# Patient Record
Sex: FEMALE
[De-identification: names, ages, dates, MRNs, and addresses within clinical notes are randomized per-mention and may not be internally consistent; named-entity substitution may affect disease eponyms.]

---

## 2021-09-29 DIAGNOSIS — Z78.9 OTHER SPECIFIED HEALTH STATUS: ICD-10-CM

## 2021-09-29 RX ORDER — BLOOD SUGAR DIAGNOSTIC
STRIP MISCELLANEOUS
Refills: 0 | Status: ACTIVE | COMMUNITY

## 2021-09-29 RX ORDER — LANCETS
EACH MISCELLANEOUS
Refills: 0 | Status: ACTIVE | COMMUNITY

## 2021-09-30 ENCOUNTER — APPOINTMENT (OUTPATIENT)
Dept: PHYSICAL MEDICINE AND REHAB | Facility: CLINIC | Age: 61
End: 2021-09-30
Payer: COMMERCIAL

## 2021-09-30 VITALS
HEIGHT: 66 IN | WEIGHT: 268 LBS | SYSTOLIC BLOOD PRESSURE: 160 MMHG | OXYGEN SATURATION: 98 % | DIASTOLIC BLOOD PRESSURE: 90 MMHG | RESPIRATION RATE: 16 BRPM | TEMPERATURE: 97.5 F | BODY MASS INDEX: 43.07 KG/M2 | HEART RATE: 68 BPM

## 2021-09-30 DIAGNOSIS — M19.072 PRIMARY OSTEOARTHRITIS, RIGHT ANKLE AND FOOT: ICD-10-CM

## 2021-09-30 DIAGNOSIS — M19.071 PRIMARY OSTEOARTHRITIS, RIGHT ANKLE AND FOOT: ICD-10-CM

## 2021-09-30 PROCEDURE — 99203 OFFICE O/P NEW LOW 30 MIN: CPT

## 2021-10-04 PROBLEM — M19.071 ARTHRITIS OF BOTH FEET: Status: ACTIVE | Noted: 2021-09-30

## 2021-10-04 NOTE — HISTORY OF PRESENT ILLNESS
[FreeTextEntry1] : 60 year old female presents with bilateral feet pain that progressive for the last few years. \par Patient also presents for bilateral knee pain.\par \par Bilateral feet pain\par She has been to a podiatrist.  His treatment did not relieve her pain.\par She saw a sports medicine in the [place] who recommended therapy.  After several sessions her pain did not get better and she stopped\par \par Pain:  8/10 Worse: 10/10 Quality: sharp  Frequency: constant\par He has a grinding sensation over the top of the dorsum of the right worse than left foot.  The pain is worse with standing more than 10 minutes.  The pain progresses as the day goes on.  She cannot have shoes rub across the top of the foot.  She denies back pain.\par \par Bilateral knee\par Pain:  8/10 Worse: 10/10 Quality: sharp  Frequency: constant\par The pain starst in the knee woithpout radiation. 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

## 2021-10-19 ENCOUNTER — APPOINTMENT (OUTPATIENT)
Dept: INTERNAL MEDICINE | Facility: CLINIC | Age: 61
End: 2021-10-19
Payer: COMMERCIAL

## 2021-10-19 VITALS
DIASTOLIC BLOOD PRESSURE: 84 MMHG | HEART RATE: 81 BPM | SYSTOLIC BLOOD PRESSURE: 158 MMHG | TEMPERATURE: 96.4 F | WEIGHT: 267 LBS | RESPIRATION RATE: 18 BRPM | BODY MASS INDEX: 42.91 KG/M2 | HEIGHT: 66 IN | OXYGEN SATURATION: 98 %

## 2021-10-19 DIAGNOSIS — Z87.898 PERSONAL HISTORY OF OTHER SPECIFIED CONDITIONS: ICD-10-CM

## 2021-10-19 DIAGNOSIS — Z23 ENCOUNTER FOR IMMUNIZATION: ICD-10-CM

## 2021-10-19 PROCEDURE — 99214 OFFICE O/P EST MOD 30 MIN: CPT | Mod: 25

## 2021-10-19 PROCEDURE — 99401 PREV MED CNSL INDIV APPRX 15: CPT

## 2021-10-19 RX ORDER — PHENTERMINE AND TOPIRAMATE 7.5; 46 MG/1; MG/1
7.5-46 CAPSULE, EXTENDED RELEASE ORAL
Refills: 0 | Status: DISCONTINUED | COMMUNITY
End: 2021-10-19

## 2021-10-20 PROBLEM — Z87.898 HISTORY OF CHEST PAIN: Status: ACTIVE | Noted: 2021-10-20

## 2021-10-21 PROBLEM — Z23 ENCOUNTER FOR IMMUNIZATION: Status: ACTIVE | Noted: 2021-10-21

## 2021-10-21 NOTE — ASSESSMENT
[FreeTextEntry1] : HTN- start losartan, low Na diet\par HLD- cont atorvastatin, d/e/wl\par Prediabetes- cont metformin, d/e/wl\par Morbid Obesity- discussed low calorie diet and exercise as part of a weight loss plan.\par Discussed the importance and benefit of a healthy lifestyle including a heart healthy diet such as the Mediterranean diet and regular exercise to try to lose weight as well as 7 hours of sleep nightly and minimization of stress where possible as a way of decreasing heart risk and improving health x 8 minutes\par Pt. was encouraged to do all relevant screening tests including but not limited to mammogram, gyn visit, colonoscopy, bone density, annual skin exam.\par

## 2021-12-06 ENCOUNTER — APPOINTMENT (OUTPATIENT)
Dept: INTERNAL MEDICINE | Facility: CLINIC | Age: 61
End: 2021-12-06
Payer: COMMERCIAL

## 2021-12-06 ENCOUNTER — NON-APPOINTMENT (OUTPATIENT)
Age: 61
End: 2021-12-06

## 2021-12-06 VITALS
BODY MASS INDEX: 42.91 KG/M2 | SYSTOLIC BLOOD PRESSURE: 124 MMHG | DIASTOLIC BLOOD PRESSURE: 86 MMHG | HEART RATE: 73 BPM | HEIGHT: 66 IN | OXYGEN SATURATION: 97 % | TEMPERATURE: 96.9 F | RESPIRATION RATE: 16 BRPM | WEIGHT: 267 LBS

## 2021-12-06 DIAGNOSIS — Z00.00 ENCOUNTER FOR GENERAL ADULT MEDICAL EXAMINATION W/OUT ABNORMAL FINDINGS: ICD-10-CM

## 2021-12-06 DIAGNOSIS — R94.31 ABNORMAL ELECTROCARDIOGRAM [ECG] [EKG]: ICD-10-CM

## 2021-12-06 DIAGNOSIS — M79.676 PAIN IN UNSPECIFIED TOE(S): ICD-10-CM

## 2021-12-06 DIAGNOSIS — M17.0 BILATERAL PRIMARY OSTEOARTHRITIS OF KNEE: ICD-10-CM

## 2021-12-06 PROCEDURE — 93000 ELECTROCARDIOGRAM COMPLETE: CPT | Mod: 59

## 2021-12-06 PROCEDURE — 99214 OFFICE O/P EST MOD 30 MIN: CPT | Mod: 25

## 2021-12-06 PROCEDURE — 99396 PREV VISIT EST AGE 40-64: CPT | Mod: 25

## 2021-12-06 PROCEDURE — G0447 BEHAVIOR COUNSEL OBESITY 15M: CPT

## 2021-12-06 RX ORDER — METFORMIN HYDROCHLORIDE 500 MG/1
500 TABLET, COATED ORAL
Refills: 0 | Status: DISCONTINUED | COMMUNITY
End: 2021-12-06

## 2021-12-06 RX ORDER — MELOXICAM 15 MG/1
15 TABLET ORAL
Qty: 30 | Refills: 0 | Status: DISCONTINUED | COMMUNITY
Start: 2021-10-25

## 2021-12-07 LAB
25(OH)D3 SERPL-MCNC: 38.8 NG/ML
ALBUMIN SERPL ELPH-MCNC: 4.6 G/DL
ALP BLD-CCNC: 86 U/L
ALT SERPL-CCNC: 16 U/L
ANION GAP SERPL CALC-SCNC: 13 MMOL/L
APPEARANCE: CLEAR
APPEARANCE: CLEAR
AST SERPL-CCNC: 14 U/L
BACTERIA: NEGATIVE
BASOPHILS # BLD AUTO: 0.03 K/UL
BASOPHILS NFR BLD AUTO: 0.8 %
BILIRUB SERPL-MCNC: 0.4 MG/DL
BILIRUBIN URINE: NEGATIVE
BILIRUBIN URINE: NEGATIVE
BLOOD URINE: NEGATIVE
BLOOD URINE: NEGATIVE
BUN SERPL-MCNC: 14 MG/DL
CALCIUM SERPL-MCNC: 10.4 MG/DL
CHLORIDE SERPL-SCNC: 101 MMOL/L
CHOLEST SERPL-MCNC: 305 MG/DL
CO2 SERPL-SCNC: 25 MMOL/L
COLOR: NORMAL
COLOR: NORMAL
CREAT SERPL-MCNC: 0.71 MG/DL
EOSINOPHIL # BLD AUTO: 0.08 K/UL
EOSINOPHIL NFR BLD AUTO: 2.1 %
ESTIMATED AVERAGE GLUCOSE: 203 MG/DL
GLUCOSE QUALITATIVE U: NEGATIVE
GLUCOSE QUALITATIVE U: NEGATIVE
GLUCOSE SERPL-MCNC: 160 MG/DL
HBA1C MFR BLD HPLC: 8.7 %
HCT VFR BLD CALC: 40.7 %
HDLC SERPL-MCNC: 70 MG/DL
HGB BLD-MCNC: 13.2 G/DL
HYALINE CASTS: 1 /LPF
IMM GRANULOCYTES NFR BLD AUTO: 0.3 %
KETONES URINE: NEGATIVE
KETONES URINE: NEGATIVE
LDLC SERPL CALC-MCNC: 195 MG/DL
LEUKOCYTE ESTERASE URINE: ABNORMAL
LEUKOCYTE ESTERASE URINE: ABNORMAL
LYMPHOCYTES # BLD AUTO: 1.82 K/UL
LYMPHOCYTES NFR BLD AUTO: 46.9 %
MAN DIFF?: NORMAL
MCHC RBC-ENTMCNC: 30.3 PG
MCHC RBC-ENTMCNC: 32.4 GM/DL
MCV RBC AUTO: 93.3 FL
MICROSCOPIC-UA: NORMAL
MONOCYTES # BLD AUTO: 0.37 K/UL
MONOCYTES NFR BLD AUTO: 9.5 %
NEUTROPHILS # BLD AUTO: 1.57 K/UL
NEUTROPHILS NFR BLD AUTO: 40.4 %
NITRITE URINE: NEGATIVE
NITRITE URINE: NEGATIVE
NONHDLC SERPL-MCNC: 236 MG/DL
PH URINE: 5.5
PH URINE: 5.5
PLATELET # BLD AUTO: 281 K/UL
POTASSIUM SERPL-SCNC: 4.9 MMOL/L
PROT SERPL-MCNC: 7.2 G/DL
PROTEIN URINE: NEGATIVE
PROTEIN URINE: NEGATIVE
RBC # BLD: 4.36 M/UL
RBC # FLD: 12.7 %
RED BLOOD CELLS URINE: 0 /HPF
SODIUM SERPL-SCNC: 139 MMOL/L
SPECIFIC GRAVITY URINE: 1.01
SPECIFIC GRAVITY URINE: 1.01
SQUAMOUS EPITHELIAL CELLS: 1 /HPF
T3 SERPL-MCNC: 89 NG/DL
T4 FREE SERPL-MCNC: 1.2 NG/DL
TRIGL SERPL-MCNC: 203 MG/DL
TSH SERPL-ACNC: 3.73 UIU/ML
UROBILINOGEN URINE: NORMAL
UROBILINOGEN URINE: NORMAL
VIT B12 SERPL-MCNC: 486 PG/ML
WBC # FLD AUTO: 3.88 K/UL
WHITE BLOOD CELLS URINE: 4 /HPF

## 2021-12-08 PROBLEM — R94.31 ABNORMAL EKG: Status: ACTIVE | Noted: 2021-12-06

## 2021-12-08 PROBLEM — M17.0 PRIMARY OSTEOARTHRITIS OF BOTH KNEES: Status: ACTIVE | Noted: 2021-10-04

## 2021-12-08 PROBLEM — Z00.00 ENCOUNTER FOR PREVENTIVE HEALTH EXAMINATION: Status: ACTIVE | Noted: 2021-09-29

## 2021-12-08 PROBLEM — M79.676 TOE PAIN: Status: RESOLVED | Noted: 2021-10-20 | Resolved: 2021-12-08

## 2021-12-08 NOTE — ASSESSMENT
[FreeTextEntry1] : DM2- chk labs, hasn’t been on meds\par HTN- stable, cont losartan\par HLD- chk labs, cont atorv 10mg\par Morbid Obesity- discussed low calorie diet and exercise as part of a weight loss plan.\par OA Knees- sees ortho\par see optho yearly\par chk feet nightly\par take all meds as prescribed\par aware of complications of diabetes including but not limited to retinopathy,neuropathy, cvd, esrd,cva,amputations. \par aware of the importance of diet and exercise and maintaining an adequate weight\par chk labs\par Low Na diet\par diet and exercise\par low cholesterol diet\par exercise\par wt loss.\par Discussed healthy lifestyle, updated vaccinations, healthy diet, exercise, chk labs, discussed appropriate screening tests including colonoscopy, mammo, bone density and pap.\par \par

## 2021-12-08 NOTE — HEALTH RISK ASSESSMENT
[Yes] : Yes [2 - 3 times a week (3 pts)] : 2 - 3  times a week (3 points) [1 or 2 (0 pts)] : 1 or 2 (0 points) [Patient reported mammogram was normal] : Patient reported mammogram was normal [Good] : ~his/her~  mood as  good [] : No [No] : In the past 12 months have you used drugs other than those required for medical reasons? No [No falls in past year] : Patient reported no falls in the past year [0] : 2) Feeling down, depressed, or hopeless: Not at all (0) [PHQ-2 Negative - No further assessment needed] : PHQ-2 Negative - No further assessment needed [Audit-CScore] : 3 [de-identified] : none [de-identified] : reg [[id number]] : 0 [Patient declined colonoscopy] : Patient declined colonoscopy [Change in mental status noted] : No change in mental status noted [Language] : denies difficulty with language [Behavior] : denies difficulty with behavior [Handling Complex Tasks] : denies difficulty handling complex tasks [Reasoning] : denies difficulty with reasoning [None] : None [Fully functional (bathing, dressing, toileting, transferring, walking, feeding)] : Fully functional (bathing, dressing, toileting, transferring, walking, feeding) [Fully functional (using the telephone, shopping, preparing meals, housekeeping, doing laundry, using] : Fully functional and needs no help or supervision to perform IADLs (using the telephone, shopping, preparing meals, housekeeping, doing laundry, using transportation, managing medications and managing finances) [Reports changes in hearing] : Reports no changes in hearing [Reports changes in vision] : Reports no changes in vision [Reports changes in dental health] : Reports no changes in dental health [MammogramDate] : 2016

## 2021-12-08 NOTE — HISTORY OF PRESENT ILLNESS
[FreeTextEntry1] : joseluis [de-identified] : covid booster this week\par flu 2wk\par decl shingrex\par td pending\par sees optho twice a year.\par bilat knee pain- saw ortho- xrays showed arhtritis. \par Otherwise feels good. Appet, sleep, bms, voiding, mood all ok. \par diff. losing weight\par

## 2021-12-08 NOTE — PHYSICAL EXAM
[No Acute Distress] : no acute distress [Well Nourished] : well nourished [Well Developed] : well developed [Well-Appearing] : well-appearing [Normal Sclera/Conjunctiva] : normal sclera/conjunctiva [PERRL] : pupils equal round and reactive to light [EOMI] : extraocular movements intact [Normal Outer Ear/Nose] : the outer ears and nose were normal in appearance [Normal Oropharynx] : the oropharynx was normal [No JVD] : no jugular venous distention [No Lymphadenopathy] : no lymphadenopathy [Supple] : supple [Thyroid Normal, No Nodules] : the thyroid was normal and there were no nodules present [No Respiratory Distress] : no respiratory distress  [No Accessory Muscle Use] : no accessory muscle use [Clear to Auscultation] : lungs were clear to auscultation bilaterally [Normal Rate] : normal rate  [Regular Rhythm] : with a regular rhythm [Normal S1, S2] : normal S1 and S2 [No Murmur] : no murmur heard [No Carotid Bruits] : no carotid bruits [No Abdominal Bruit] : a ~M bruit was not heard ~T in the abdomen [No Varicosities] : no varicosities [Pedal Pulses Present] : the pedal pulses are present [No Edema] : there was no peripheral edema [No Palpable Aorta] : no palpable aorta [No Extremity Clubbing/Cyanosis] : no extremity clubbing/cyanosis [Soft] : abdomen soft [Non Tender] : non-tender [Non-distended] : non-distended [No Masses] : no abdominal mass palpated [No HSM] : no HSM [Normal Bowel Sounds] : normal bowel sounds [Normal Posterior Cervical Nodes] : no posterior cervical lymphadenopathy [Normal Anterior Cervical Nodes] : no anterior cervical lymphadenopathy [No CVA Tenderness] : no CVA  tenderness [No Spinal Tenderness] : no spinal tenderness [No Joint Swelling] : no joint swelling [Grossly Normal Strength/Tone] : grossly normal strength/tone [No Rash] : no rash [Coordination Grossly Intact] : coordination grossly intact [No Focal Deficits] : no focal deficits [Normal Gait] : normal gait [Deep Tendon Reflexes (DTR)] : deep tendon reflexes were 2+ and symmetric [Normal Affect] : the affect was normal [Normal Insight/Judgement] : insight and judgment were intact [de-identified] : knees- arthritic

## 2021-12-08 NOTE — COUNSELING
[Potential consequences of obesity discussed] : Potential consequences of obesity discussed [Benefits of weight loss discussed] : Benefits of weight loss discussed [Structured Weight Management Program suggested:] : Structured weight management program suggested [Encouraged to maintain food diary] : Encouraged to maintain food diary [Encouraged to increase physical activity] : Encouraged to increase physical activity [Target Wt Loss Goal ___] : Weight Loss Goals: Target weight loss goal [unfilled] lbs [Decrease Portions] : decrease portions [____ min/wk Activity] : [unfilled] min/wk activity [FreeTextEntry1] : 2221cal [FreeTextEntry4] : 15

## 2021-12-09 LAB — BACTERIA UR CULT: NORMAL

## 2022-03-04 ENCOUNTER — APPOINTMENT (OUTPATIENT)
Dept: INTERNAL MEDICINE | Facility: CLINIC | Age: 62
End: 2022-03-04
Payer: COMMERCIAL

## 2022-03-04 DIAGNOSIS — Z11.52 ENCOUNTER FOR SCREENING FOR COVID-19: ICD-10-CM

## 2022-03-04 PROCEDURE — 99211 OFF/OP EST MAY X REQ PHY/QHP: CPT

## 2022-03-07 ENCOUNTER — NON-APPOINTMENT (OUTPATIENT)
Age: 62
End: 2022-03-07

## 2022-03-07 LAB — SARS-COV-2 N GENE NPH QL NAA+PROBE: NOT DETECTED

## 2022-05-02 ENCOUNTER — APPOINTMENT (OUTPATIENT)
Dept: INTERNAL MEDICINE | Facility: CLINIC | Age: 62
End: 2022-05-02
Payer: COMMERCIAL

## 2022-05-02 VITALS
SYSTOLIC BLOOD PRESSURE: 140 MMHG | TEMPERATURE: 98.3 F | HEART RATE: 66 BPM | DIASTOLIC BLOOD PRESSURE: 86 MMHG | WEIGHT: 239 LBS | HEIGHT: 66 IN | OXYGEN SATURATION: 98 % | RESPIRATION RATE: 16 BRPM | BODY MASS INDEX: 38.41 KG/M2

## 2022-05-02 DIAGNOSIS — E11.9 TYPE 2 DIABETES MELLITUS W/OUT COMPLICATIONS: ICD-10-CM

## 2022-05-02 DIAGNOSIS — E78.5 HYPERLIPIDEMIA, UNSPECIFIED: ICD-10-CM

## 2022-05-02 DIAGNOSIS — R73.03 PREDIABETES.: ICD-10-CM

## 2022-05-02 DIAGNOSIS — E11.65 TYPE 2 DIABETES MELLITUS WITH HYPERGLYCEMIA: ICD-10-CM

## 2022-05-02 DIAGNOSIS — E66.01 MORBID (SEVERE) OBESITY DUE TO EXCESS CALORIES: ICD-10-CM

## 2022-05-02 PROCEDURE — 99214 OFFICE O/P EST MOD 30 MIN: CPT | Mod: 25

## 2022-05-02 NOTE — ASSESSMENT
[FreeTextEntry1] : DM2- chk labs, cont jardiamce, metformin\par HTN- stable, cont losartan\par HLD- chk labs, cont atorv 10mg\par Morbid Obesity- discussed low calorie diet and exercise as part of a weight loss plan. x 8 min\par OA Knees- sees ortho\par see optho yearly\par chk feet nightly\par take all meds as prescribed\par aware of complications of diabetes including but not limited to retinopathy,neuropathy, cvd, esrd,cva,amputations. \par aware of the importance of diet and exercise and maintaining an adequate weight\par chk labs\par Low Na diet\par diet and exercise\par low cholesterol diet\par exercise\par wt loss.\par Discussed healthy lifestyle, updated vaccinations, healthy diet, exercise, chk labs, discussed appropriate screening tests including colonoscopy, mammo, bone density and pap.\par \par

## 2022-05-02 NOTE — HISTORY OF PRESENT ILLNESS
[FreeTextEntry1] : The patient is here for a follow up visit to review their medications and chronic medical conditions.\par dm2 [de-identified] : Here for follow up visit. Doing well. Appetitie, sleep,bms,voiding, mood all ok. \par Chart reviewed.\par

## 2022-05-04 LAB
ALBUMIN SERPL ELPH-MCNC: 4.4 G/DL
ALP BLD-CCNC: 61 U/L
ALT SERPL-CCNC: 16 U/L
ANION GAP SERPL CALC-SCNC: 14 MMOL/L
APPEARANCE: CLEAR
AST SERPL-CCNC: 19 U/L
BACTERIA: NEGATIVE
BASOPHILS # BLD AUTO: 0.03 K/UL
BASOPHILS NFR BLD AUTO: 0.8 %
BILIRUB SERPL-MCNC: 0.3 MG/DL
BILIRUBIN URINE: NEGATIVE
BLOOD URINE: NEGATIVE
BUN SERPL-MCNC: 13 MG/DL
CALCIUM SERPL-MCNC: 9.8 MG/DL
CHLORIDE SERPL-SCNC: 109 MMOL/L
CHOLEST SERPL-MCNC: 240 MG/DL
CO2 SERPL-SCNC: 22 MMOL/L
COLOR: YELLOW
CREAT SERPL-MCNC: 0.71 MG/DL
CREAT SPEC-SCNC: 157 MG/DL
EGFR: 96 ML/MIN/1.73M2
EOSINOPHIL # BLD AUTO: 0.12 K/UL
EOSINOPHIL NFR BLD AUTO: 3.1 %
ESTIMATED AVERAGE GLUCOSE: 160 MG/DL
GLUCOSE QUALITATIVE U: NEGATIVE
GLUCOSE SERPL-MCNC: 135 MG/DL
HBA1C MFR BLD HPLC: 7.2 %
HCT VFR BLD CALC: 38.5 %
HDLC SERPL-MCNC: 60 MG/DL
HGB BLD-MCNC: 11.7 G/DL
HYALINE CASTS: 0 /LPF
IMM GRANULOCYTES NFR BLD AUTO: 0.3 %
KETONES URINE: NEGATIVE
LDLC SERPL CALC-MCNC: 154 MG/DL
LEUKOCYTE ESTERASE URINE: NEGATIVE
LYMPHOCYTES # BLD AUTO: 1.48 K/UL
LYMPHOCYTES NFR BLD AUTO: 37.8 %
MAN DIFF?: NORMAL
MCHC RBC-ENTMCNC: 28.4 PG
MCHC RBC-ENTMCNC: 30.4 GM/DL
MCV RBC AUTO: 93.4 FL
MICROALBUMIN 24H UR DL<=1MG/L-MCNC: 2.2 MG/DL
MICROALBUMIN/CREAT 24H UR-RTO: 14 MG/G
MICROSCOPIC-UA: NORMAL
MONOCYTES # BLD AUTO: 0.36 K/UL
MONOCYTES NFR BLD AUTO: 9.2 %
NEUTROPHILS # BLD AUTO: 1.92 K/UL
NEUTROPHILS NFR BLD AUTO: 48.8 %
NITRITE URINE: NEGATIVE
NONHDLC SERPL-MCNC: 180 MG/DL
PH URINE: 5.5
PLATELET # BLD AUTO: 241 K/UL
POTASSIUM SERPL-SCNC: 4.2 MMOL/L
PROT SERPL-MCNC: 6.9 G/DL
PROTEIN URINE: NORMAL
RBC # BLD: 4.12 M/UL
RBC # FLD: 14.1 %
RED BLOOD CELLS URINE: 1 /HPF
SODIUM SERPL-SCNC: 146 MMOL/L
SPECIFIC GRAVITY URINE: 1.02
SQUAMOUS EPITHELIAL CELLS: 2 /HPF
T3 SERPL-MCNC: 99 NG/DL
T4 FREE SERPL-MCNC: 1.3 NG/DL
TRIGL SERPL-MCNC: 128 MG/DL
TSH SERPL-ACNC: 1.9 UIU/ML
UROBILINOGEN URINE: NORMAL
WBC # FLD AUTO: 3.92 K/UL
WHITE BLOOD CELLS URINE: 5 /HPF

## 2022-05-05 LAB — BACTERIA UR CULT: NORMAL

## 2022-05-06 ENCOUNTER — RX CHANGE (OUTPATIENT)
Age: 62
End: 2022-05-06

## 2022-05-06 RX ORDER — ATORVASTATIN CALCIUM 20 MG/1
20 TABLET, FILM COATED ORAL
Qty: 90 | Refills: 3 | Status: ACTIVE | COMMUNITY
Start: 1900-01-01 | End: 1900-01-01

## 2022-05-09 ENCOUNTER — RX CHANGE (OUTPATIENT)
Age: 62
End: 2022-05-09

## 2022-06-02 ENCOUNTER — NON-APPOINTMENT (OUTPATIENT)
Age: 62
End: 2022-06-02

## 2022-06-10 ENCOUNTER — APPOINTMENT (OUTPATIENT)
Dept: GASTROENTEROLOGY | Facility: CLINIC | Age: 62
End: 2022-06-10

## 2022-06-30 ENCOUNTER — APPOINTMENT (OUTPATIENT)
Dept: INTERNAL MEDICINE | Facility: CLINIC | Age: 62
End: 2022-06-30

## 2022-11-02 ENCOUNTER — RX RENEWAL (OUTPATIENT)
Age: 62
End: 2022-11-02

## 2022-11-13 ENCOUNTER — RX RENEWAL (OUTPATIENT)
Age: 62
End: 2022-11-13

## 2023-01-19 ENCOUNTER — APPOINTMENT (OUTPATIENT)
Dept: INTERNAL MEDICINE | Facility: CLINIC | Age: 63
End: 2023-01-19
Payer: COMMERCIAL

## 2023-01-19 VITALS
HEIGHT: 66 IN | BODY MASS INDEX: 39.86 KG/M2 | DIASTOLIC BLOOD PRESSURE: 90 MMHG | RESPIRATION RATE: 17 BRPM | OXYGEN SATURATION: 98 % | TEMPERATURE: 98 F | HEART RATE: 72 BPM | WEIGHT: 248 LBS | SYSTOLIC BLOOD PRESSURE: 140 MMHG

## 2023-01-19 DIAGNOSIS — S80.12XA CONTUSION OF LEFT LOWER LEG, INITIAL ENCOUNTER: ICD-10-CM

## 2023-01-19 DIAGNOSIS — I10 ESSENTIAL (PRIMARY) HYPERTENSION: ICD-10-CM

## 2023-01-19 PROCEDURE — 99214 OFFICE O/P EST MOD 30 MIN: CPT
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
## 2023-01-19 NOTE — PHYSICAL EXAM
[No Acute Distress] : no acute distress [Well Nourished] : well nourished [Well Developed] : well developed [Well-Appearing] : well-appearing [Normal Sclera/Conjunctiva] : normal sclera/conjunctiva [PERRL] : pupils equal round and reactive to light [EOMI] : extraocular movements intact [Normal Outer Ear/Nose] : the outer ears and nose were normal in appearance [Normal Oropharynx] : the oropharynx was normal [No JVD] : no jugular venous distention [No Lymphadenopathy] : no lymphadenopathy [Supple] : supple [Thyroid Normal, No Nodules] : the thyroid was normal and there were no nodules present [No Respiratory Distress] : no respiratory distress  [No Accessory Muscle Use] : no accessory muscle use [Clear to Auscultation] : lungs were clear to auscultation bilaterally [Normal Rate] : normal rate  [Regular Rhythm] : with a regular rhythm [Normal S1, S2] : normal S1 and S2 [No Murmur] : no murmur heard [No Carotid Bruits] : no carotid bruits [No Abdominal Bruit] : a ~M bruit was not heard ~T in the abdomen [No Varicosities] : no varicosities [Pedal Pulses Present] : the pedal pulses are present [No Edema] : there was no peripheral edema [No Palpable Aorta] : no palpable aorta [No Extremity Clubbing/Cyanosis] : no extremity clubbing/cyanosis [Soft] : abdomen soft [Non Tender] : non-tender [Non-distended] : non-distended [No Masses] : no abdominal mass palpated [No HSM] : no HSM [Normal Bowel Sounds] : normal bowel sounds [Normal Posterior Cervical Nodes] : no posterior cervical lymphadenopathy [Normal Anterior Cervical Nodes] : no anterior cervical lymphadenopathy [No CVA Tenderness] : no CVA  tenderness [No Spinal Tenderness] : no spinal tenderness [No Joint Swelling] : no joint swelling [Grossly Normal Strength/Tone] : grossly normal strength/tone [No Rash] : no rash [Coordination Grossly Intact] : coordination grossly intact [No Focal Deficits] : no focal deficits [Normal Gait] : normal gait [Deep Tendon Reflexes (DTR)] : deep tendon reflexes were 2+ and symmetric [Normal Affect] : the affect was normal [Normal Insight/Judgement] : insight and judgment were intact [de-identified] : left anteromedial shin- 5 cm area of induration, hyperpigmentation, no calf tend or edema noted. homans neg.

## 2023-01-19 NOTE — ASSESSMENT
[FreeTextEntry1] : Left leg contusion- conserv tx\par HTN- ilncr losartan to 100mg , low Na diet\par rto for awv.

## 2023-02-08 ENCOUNTER — RX RENEWAL (OUTPATIENT)
Age: 63
End: 2023-02-08

## 2023-02-08 DIAGNOSIS — E11.9 TYPE 2 DIABETES MELLITUS W/OUT COMPLICATIONS: ICD-10-CM

## 2023-02-10 RX ORDER — LOSARTAN POTASSIUM 100 MG/1
100 TABLET, FILM COATED ORAL
Qty: 90 | Refills: 3 | Status: ACTIVE | COMMUNITY
Start: 2021-10-19 | End: 1900-01-01

## 2023-03-02 ENCOUNTER — APPOINTMENT (OUTPATIENT)
Dept: OTOLARYNGOLOGY | Facility: CLINIC | Age: 63
End: 2023-03-02
Payer: COMMERCIAL

## 2023-03-02 PROCEDURE — 69220 CLEAN OUT MASTOID CAVITY: CPT | Mod: LT

## 2023-03-02 PROCEDURE — 99204 OFFICE O/P NEW MOD 45 MIN: CPT | Mod: 25

## 2023-03-02 PROCEDURE — 92567 TYMPANOMETRY: CPT

## 2023-03-02 PROCEDURE — 92557 COMPREHENSIVE HEARING TEST: CPT

## 2023-03-02 RX ORDER — CIPROFLOXACIN AND DEXAMETHASONE 3; 1 MG/ML; MG/ML
0.3-0.1 SUSPENSION/ DROPS AURICULAR (OTIC) TWICE DAILY
Qty: 8 | Refills: 1 | Status: ACTIVE | COMMUNITY
Start: 2023-03-02 | End: 1900-01-01

## 2023-03-02 NOTE — PHYSICAL EXAM
[Normal] : no abnormal secretions [de-identified] : mastoid crusting left ear, extensive powder, right ear with cerumen impaction  [de-identified] : left TM recon intact, right ?50% perforation vs retraction posterior inferior

## 2023-03-02 NOTE — CONSULT LETTER
[Dear  ___] : Dear  [unfilled], [Consult Letter:] : I had the pleasure of evaluating your patient, [unfilled]. [Sincerely,] : Sincerely, [FreeTextEntry3] : Maureen Thakur MD\par Otolaryngology- Facial Plastics \par [address]\[place] [address]\par (P) - [phone]\par (F) - [phone]\par

## 2023-03-02 NOTE — PROCEDURE
[Risk and Benefits Discussed] : The purpose, risks, discomforts, benefits and alternatives of the procedure have been explained to the patient including no treatment. [Same] : same as the Pre Op Dx. [] : Debridement of Mastoid [FreeTextEntry3] : After informed verbal consent is obtained, cerumen is removed from the right ear canal with a  suction. Pt tolerated well, no residual ear canal irritation. \par  \par \par

## 2023-03-02 NOTE — REASON FOR VISIT
[Initial Consultation] : an initial consultation for [Ear Drainage] : ear drainage [Ear Pain] : ear pain [Hearing Loss] : hearing loss

## 2023-03-02 NOTE — ASSESSMENT
[FreeTextEntry1] : Ms. COLE is a 62 year female with extensive ear history s/p L mastoidectomy many years ago, with worsening L tinnitus and ear clogging over the last 4 months. On exam she has extensive debris L and cerumen R\par \par - mastoid debridement left \par - cerumen removal right \par - audio today shows significant hearing loss, pt notes that the left ear only recently worsened to such severe hearing loss \par - will give Ciprodex for use in the event of otorrhea, advised do not take more than 7 days in a row \par - will refer to Otology - message sent to help facilitate appt, will refer sooner than next debridement given the degree of hearing loss and that it has changed recently \par \par
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
## 2023-03-02 NOTE — DATA REVIEWED
[de-identified] : 03/02/23: AS: moderate severe to profound mixed HL \par AD: normal to profound SNHL

## 2023-03-02 NOTE — END OF VISIT
[FreeTextEntry3] : I personally saw and examined RAMEZ COLE in detail.  I spoke to JULIO Haines regarding the assessment and plan of care. I performed the procedures and relevant physical exam.  I have reviewed the above assessment and plan of care and I agree.  I have made changes to the body of the note wherever necessary and appropriate.\par

## 2023-03-20 ENCOUNTER — APPOINTMENT (OUTPATIENT)
Dept: INTERNAL MEDICINE | Facility: CLINIC | Age: 63
End: 2023-03-20

## 2023-03-27 ENCOUNTER — APPOINTMENT (OUTPATIENT)
Dept: INTERNAL MEDICINE | Facility: CLINIC | Age: 63
End: 2023-03-27
Payer: COMMERCIAL

## 2023-03-27 VITALS
DIASTOLIC BLOOD PRESSURE: 96 MMHG | BODY MASS INDEX: 39.86 KG/M2 | HEART RATE: 81 BPM | TEMPERATURE: 97.9 F | WEIGHT: 248 LBS | OXYGEN SATURATION: 99 % | RESPIRATION RATE: 17 BRPM | SYSTOLIC BLOOD PRESSURE: 148 MMHG | HEIGHT: 66 IN

## 2023-03-27 DIAGNOSIS — M71.21 SYNOVIAL CYST OF POPLITEAL SPACE [BAKER], RIGHT KNEE: ICD-10-CM

## 2023-03-27 DIAGNOSIS — M71.22 SYNOVIAL CYST OF POPLITEAL SPACE [BAKER], LEFT KNEE: ICD-10-CM

## 2023-03-27 PROCEDURE — 99212 OFFICE O/P EST SF 10 MIN: CPT

## 2023-03-28 ENCOUNTER — RX CHANGE (OUTPATIENT)
Age: 63
End: 2023-03-28

## 2023-03-28 PROBLEM — M71.22 SYNOVIAL CYST OF LEFT KNEE: Status: RESOLVED | Noted: 2023-03-27 | Resolved: 2023-03-28

## 2023-03-28 RX ORDER — DICLOFENAC SODIUM 1% 10 MG/G
1 GEL TOPICAL
Qty: 100 | Refills: 1 | Status: DISCONTINUED | COMMUNITY
Start: 2023-03-27 | End: 2023-03-28

## 2023-03-28 RX ORDER — DICLOFENAC SODIUM 1% 10 MG/G
1 GEL TOPICAL
Qty: 100 | Refills: 1 | Status: ACTIVE | COMMUNITY
Start: 2023-03-28 | End: 1900-01-01

## 2023-03-28 NOTE — REVIEW OF SYSTEMS
[Negative] : Heme/Lymph [FreeTextEntry2] : Morbidly obese [FreeTextEntry9] : Pain and fullness posterior left knee

## 2023-03-28 NOTE — HISTORY OF PRESENT ILLNESS
[FreeTextEntry1] : cc/ left knee pain. swollen.  [de-identified] : Patient had sudden pain pot of the posterior knee.  She has a history of a Baker's cyst.  Patient also has a history of hypertension hyperlipidemia and morbid obesity

## 2023-03-28 NOTE — PHYSICAL EXAM
[No Acute Distress] : no acute distress [Well Nourished] : well nourished [Well Developed] : well developed [Well-Appearing] : well-appearing [No Murmur] : no murmur heard [No Carotid Bruits] : no carotid bruits [No Abdominal Bruit] : a ~M bruit was not heard ~T in the abdomen [No Varicosities] : no varicosities [Pedal Pulses Present] : the pedal pulses are present [No Edema] : there was no peripheral edema [No Palpable Aorta] : no palpable aorta [No Extremity Clubbing/Cyanosis] : no extremity clubbing/cyanosis [Soft] : abdomen soft [Non Tender] : non-tender [Non-distended] : non-distended [No Masses] : no abdominal mass palpated [No HSM] : no HSM [Normal Bowel Sounds] : normal bowel sounds [No CVA Tenderness] : no CVA  tenderness [No Spinal Tenderness] : no spinal tenderness [No Joint Swelling] : no joint swelling [Grossly Normal Strength/Tone] : grossly normal strength/tone [No Rash] : no rash [Coordination Grossly Intact] : coordination grossly intact [No Focal Deficits] : no focal deficits [Normal Gait] : normal gait [Deep Tendon Reflexes (DTR)] : deep tendon reflexes were 2+ and symmetric [Normal Affect] : the affect was normal [Normal Insight/Judgement] : insight and judgment were intact [Normal] : no rash [Alert and Oriented x3] : oriented to person, place, and time [de-identified] : Morbidly obese [de-identified] : Left lower extremity examined [de-identified] :  Left popliteal fossa full and tender popliteal cyst noted. [de-identified] : We will neurological exam left lower extremity unremarkable

## 2023-03-28 NOTE — HEALTH RISK ASSESSMENT
[Fair] :  ~his/her~ mood as fair [None] : None [Fully functional (bathing, dressing, toileting, transferring, walking, feeding)] : Fully functional (bathing, dressing, toileting, transferring, walking, feeding) [Fully functional (using the telephone, shopping, preparing meals, housekeeping, doing laundry, using] : Fully functional and needs no help or supervision to perform IADLs (using the telephone, shopping, preparing meals, housekeeping, doing laundry, using transportation, managing medications and managing finances) [Reports normal functional visual acuity (ie: able to read med bottle)] : Reports normal functional visual acuity [de-identified] : Minimal [Change in mental status noted] : No change in mental status noted [Language] : denies difficulty with language [Behavior] : denies difficulty with behavior [Learning/Retaining New Information] : denies difficulty learning/retaining new information [Handling Complex Tasks] : denies difficulty handling complex tasks [Reasoning] : denies difficulty with reasoning [Spatial Ability and Orientation] : denies difficulty with spatial ability and orientation [Reports changes in hearing] : Reports no changes in hearing

## 2023-03-29 ENCOUNTER — APPOINTMENT (OUTPATIENT)
Dept: ORTHOPEDIC SURGERY | Facility: CLINIC | Age: 63
End: 2023-03-29
Payer: COMMERCIAL

## 2023-03-29 VITALS — HEIGHT: 66 IN | WEIGHT: 249 LBS | BODY MASS INDEX: 40.02 KG/M2

## 2023-03-29 PROCEDURE — 99204 OFFICE O/P NEW MOD 45 MIN: CPT | Mod: 25

## 2023-03-29 PROCEDURE — 20610 DRAIN/INJ JOINT/BURSA W/O US: CPT | Mod: 50

## 2023-03-29 PROCEDURE — J3490M: CUSTOM

## 2023-03-29 PROCEDURE — 99214 OFFICE O/P EST MOD 30 MIN: CPT | Mod: 25

## 2023-03-29 PROCEDURE — 73564 X-RAY EXAM KNEE 4 OR MORE: CPT | Mod: 50

## 2023-03-29 NOTE — HISTORY OF PRESENT ILLNESS
[de-identified] : 62 year old female  (retired )   B/L knee pain L>R knee pain since 2013 with no specific injury worsening since march 2023\par The pain is located  posterior\par The pain is associated with  swelling, stiffness\par Worse with walking, standing, stairs  and better at rest.\par Has tried Advil, topicals\par \par

## 2023-03-29 NOTE — IMAGING

## 2023-03-29 NOTE — ASSESSMENT
[FreeTextEntry1] : Bilateral  X-Ray Examination of the KNEE (4 views): severe medial and patellofemoral degenerate changes.\par \par - We discussed their diagnosis and treatment options at length including the risks and benefits of both surgical treatment with a knee replacement and non-surgical options.\par - We will continue conservative treatment with activity modification, PT, icing, weight loss, and anti-inflammatory medications.\par - The patient was provided with a PT prescription to work on ROM, hip ER/abductors strengthening, quad/hamstring stretches and strengthening, and other exercises \par - The patient was advised to let pain guide the gradual advancement of activities. \par - We also discussed the possible of a corticosteroid injection in order to help decrease inflammation and pain so that they can perform better therapy.\par - The risks, benefits, and alternatives to corticosteroid injection were reviewed with the patient and they wished to proceed with this treatment course. \par - Left knee CSI \par - Follow up as needed in 6 weeks to re-evaluate, if no improvement we spoke about possibility of viscosupplementation injections\par \par \par (may eventually need TKA)

## 2023-03-30 ENCOUNTER — APPOINTMENT (OUTPATIENT)
Dept: OTOLARYNGOLOGY | Facility: CLINIC | Age: 63
End: 2023-03-30
Payer: COMMERCIAL

## 2023-03-30 VITALS
SYSTOLIC BLOOD PRESSURE: 173 MMHG | WEIGHT: 245 LBS | BODY MASS INDEX: 40.82 KG/M2 | DIASTOLIC BLOOD PRESSURE: 109 MMHG | HEIGHT: 65 IN | HEART RATE: 57 BPM

## 2023-03-30 DIAGNOSIS — Z90.89 ACQUIRED ABSENCE OF OTHER ORGANS: ICD-10-CM

## 2023-03-30 DIAGNOSIS — H90.2 CONDUCTIVE HEARING LOSS, UNSPECIFIED: ICD-10-CM

## 2023-03-30 DIAGNOSIS — H70.12 CHRONIC MASTOIDITIS, LEFT EAR: ICD-10-CM

## 2023-03-30 DIAGNOSIS — H61.23 IMPACTED CERUMEN, BILATERAL: ICD-10-CM

## 2023-03-30 DIAGNOSIS — Z78.9 OTHER SPECIFIED HEALTH STATUS: ICD-10-CM

## 2023-03-30 DIAGNOSIS — Z86.79 PERSONAL HISTORY OF OTHER DISEASES OF THE CIRCULATORY SYSTEM: ICD-10-CM

## 2023-03-30 DIAGNOSIS — H93.13 TINNITUS, BILATERAL: ICD-10-CM

## 2023-03-30 DIAGNOSIS — Z86.39 PERSONAL HISTORY OF OTHER ENDOCRINE, NUTRITIONAL AND METABOLIC DISEASE: ICD-10-CM

## 2023-03-30 PROCEDURE — 69220 CLEAN OUT MASTOID CAVITY: CPT | Mod: LT

## 2023-03-30 PROCEDURE — 99213 OFFICE O/P EST LOW 20 MIN: CPT | Mod: 25

## 2023-03-30 RX ORDER — ADHESIVE TAPE 3"X 2.3 YD
50 MCG TAPE, NON-MEDICATED TOPICAL
Refills: 0 | Status: DISCONTINUED | COMMUNITY
End: 2023-03-30

## 2023-03-30 RX ORDER — METFORMIN ER 500 MG 500 MG/1
500 TABLET ORAL DAILY
Qty: 30 | Refills: 5 | Status: DISCONTINUED | COMMUNITY
Start: 2021-12-07 | End: 2023-03-30

## 2023-03-30 NOTE — ASSESSMENT
Patient [FreeTextEntry1] : Otoscope exam today shows dry left canal wall down cavity, with thickened near tympanic membrane reconstruction, unable to appreciate whether effusion is present based on thickening.  Posterior mastoid bowl with minor debris which was removed.  Right tympanic membrane with small anterior perforation but no retraction.  I personally reviewed and interpreted prior audiogram results which shows a left mixed hearing loss and right mostly sensorineural loss, with impaired speech discrimination scores bilaterally.\par \par Unclear etiology for subjective perception of accelerated hearing decline in left ear over the past few months.  We will check temporal bone CT to assess for middle ear fluid behind reconstructed tympanic membrane as a potential etiology of worsened hearing loss, although I also counseled her that based on the significantly thickened tympanic membrane it would be difficult to place a tube even if fluid were identified.  Pending CT we will also provide medical clearance for hearing aids bilaterally.  Would offer course of oral steroids but patient is diabetic.

## 2023-03-30 NOTE — PROCEDURE
[FreeTextEntry3] : Procedure note:  Left mastoid debridement.\par \par Description of Procedure:  Mastoid debridement was performed under the operating microscope using otologic instrumentation.  The patient tolerated the procedure without complications.\par

## 2023-03-30 NOTE — HISTORY OF PRESENT ILLNESS
[de-identified] : 62 year old woman, referred by Dr. Thakur, for Left mastoid debridement\par History of CHL - bilateral cerumen impaction - bilateral tinnitus - s/p Left mastoidectomy several years ago\par **2 other ear surgeries in the past (unsure of name) - additional surgery of Left ear and other on Right ear\par Prescribed Ciprodex drops - has not used as of yet - no otorrhea\par States hearing on Left diminished over the past few months - No hearing aids used\par Reports intermittent tinnitus of both ears - occasional dizziness when cold air pushes into Left ear\par Denies otalgia, otorrhea, headaches related to ears, vertigo, recent fevers or ear infections\par \par **BP elevated - states forgot to take medication this morning - Patient advised to follow up with PCP regarding high blood pressure.

## 2023-04-12 ENCOUNTER — OUTPATIENT (OUTPATIENT)
Dept: OUTPATIENT SERVICES | Facility: HOSPITAL | Age: 63
LOS: 1 days | End: 2023-04-12
Payer: COMMERCIAL

## 2023-04-12 ENCOUNTER — APPOINTMENT (OUTPATIENT)
Dept: CT IMAGING | Facility: IMAGING CENTER | Age: 63
End: 2023-04-12
Payer: COMMERCIAL

## 2023-04-12 DIAGNOSIS — H90.2 CONDUCTIVE HEARING LOSS, UNSPECIFIED: ICD-10-CM

## 2023-04-12 PROCEDURE — 70480 CT ORBIT/EAR/FOSSA W/O DYE: CPT | Mod: 26

## 2023-04-12 PROCEDURE — 70480 CT ORBIT/EAR/FOSSA W/O DYE: CPT

## 2023-04-13 ENCOUNTER — APPOINTMENT (OUTPATIENT)
Dept: ORTHOPEDIC SURGERY | Facility: CLINIC | Age: 63
End: 2023-04-13
Payer: COMMERCIAL

## 2023-04-13 VITALS — HEIGHT: 65 IN | WEIGHT: 245 LBS | BODY MASS INDEX: 40.82 KG/M2

## 2023-04-13 PROCEDURE — 20610 DRAIN/INJ JOINT/BURSA W/O US: CPT | Mod: 50

## 2023-04-13 PROCEDURE — 99214 OFFICE O/P EST MOD 30 MIN: CPT | Mod: 25

## 2023-04-13 PROCEDURE — J3490M: CUSTOM

## 2023-04-13 NOTE — IMAGING

## 2023-04-13 NOTE — HISTORY OF PRESENT ILLNESS
[de-identified] : 62 year old female  (retired )   B/L knee pain L>R knee pain since 2013 with no specific injury worsening since march 2023\par The pain is located  posterior\par The pain is associated with  swelling, stiffness\par Worse with walking, standing, stairs  and better at rest.\par Has tried Advil, topicals\par \par 4/13/23- had L CSI (3/29) with some relief in left knee, right still hurting\par \par

## 2023-04-13 NOTE — ASSESSMENT
[FreeTextEntry1] :  severe medial and patellofemoral degenerate changes.\par \par - We discussed their diagnosis and treatment options at length including the risks and benefits of both surgical treatment with a knee replacement and non-surgical options.\par - We will continue conservative treatment with activity modification, PT, icing, weight loss, and anti-inflammatory medications.\par - The patient was provided with a PT prescription to work on ROM, hip ER/abductors strengthening, quad/hamstring stretches and strengthening, and other exercises \par - The patient was advised to let pain guide the gradual advancement of activities. \par - We also discussed the possible of a corticosteroid injection in order to help decrease inflammation and pain so that they can perform better therapy.\par - The risks, benefits, and alternatives to corticosteroid injection were reviewed with the patient and they wished to proceed with this treatment course. \par - Right knee CSI \par - Follow up as needed in 6 weeks to re-evaluate, if no improvement we spoke about possibility of viscosupplementation injections\par \par \par (may eventually need TKA)

## 2024-01-31 ENCOUNTER — APPOINTMENT (OUTPATIENT)
Dept: ORTHOPEDIC SURGERY | Facility: CLINIC | Age: 64
End: 2024-01-31
Payer: COMMERCIAL

## 2024-01-31 PROCEDURE — 73564 X-RAY EXAM KNEE 4 OR MORE: CPT | Mod: 50

## 2024-01-31 PROCEDURE — 99214 OFFICE O/P EST MOD 30 MIN: CPT | Mod: 25

## 2024-01-31 PROCEDURE — 20610 DRAIN/INJ JOINT/BURSA W/O US: CPT | Mod: 50

## 2024-01-31 NOTE — IMAGING

## 2024-01-31 NOTE — ASSESSMENT
[FreeTextEntry1] :  Bilateral X-Ray Examination of the KNEE (4 views): severe medial and patellofemoral degenerate changes.   - We discussed their diagnosis and treatment options at length including the risks and benefits of both surgical treatment with a knee replacement and non-surgical options. - Due to risks of surgery, we will continue conservative treatment with PT, icing, and anti-inflammatory medications - The patient was provided with a PT prescription to work on ROM, hip ER/abductors strengthening, quad/hamstring stretches and strengthening, and other exercises - The patient was advised to let pain guide the gradual advancement of activities. - We also discussed the possible of a corticosteroid injection in order to help decrease inflammation and pain so that they can perform better therapy. - The risks, benefits, and alternatives to corticosteroid injection were reviewed with the patient and they wished to proceed with this treatment course. - Follow up as needed in 6 weeks to re-evaluate, if no improvement we spoke about possibility of viscosupplementation injections  (may eventually need TKA)

## 2024-01-31 NOTE — PROCEDURE
[FreeTextEntry3] :  Injection Procedure Note:  The risks, benefits, and alternatives to corticosteroid injection were reviewed with the patient.  Risks outlined include but are not limited to infection, sepsis, bleeding, scarring, skin discoloration, temporary increase in pain, syncopal episode, failure to resolve symptoms, symptoms recurrence, allergic reaction, flare reaction, and elevation of blood sugar in diabetics.  Patient understood the risks and asked to proceed with this treatment course.  Patient Identification Name/: Verbal with patient and/or family  Procedure Verification: Procedure confirmed with patient or family/designee Consent for procedure: Verbal Consent Given Relevant documentation completed, reviewed, and signed Clinical indications for procedure confirmed  Time-out with all members of procedure team immediately prior to procedure: Correct patient identified. Agreement on procedure. Correct side and site.  KNEE INJECTION (STEROID) - BILATERAL After verbal consent and identification of the correct patient and correct site, the superolateral bilateral knees was prepped using alcohol swabs and betadine. This was allowed time to air dry. A mixture of 1cc DepoMedrol 40mg/ml, 3cc Lidocaine 1%, and 3cc Bupivacaine 0.5% was injected into the suprapatellar pouch using a sterile 22G needle after ethyl chloride spray for skin anesthesia. The patient tolerated the procedure well. After-care instructions were provided and included instructions to ice the area and to call if redness, pain, or fever develop.

## 2024-01-31 NOTE — HISTORY OF PRESENT ILLNESS
[de-identified] : 63 year old female  (retired )   B/L knee pain L>R knee pain since 2013 with no specific injury worsening since march 2023 The pain is located  posterior The pain is associated with  swelling, stiffness Worse with walking, standing, stairs  and better at rest. Has tried Advil, topicals  4/13/23- had L CSI (3/29) with some relief in left knee, right still hurting 1/31/24- R knee CSI (4/13/23) B/L knee pain returns, L>R since 12/2023, PT at [facility] and [facility]

## 2024-02-05 ENCOUNTER — RX RENEWAL (OUTPATIENT)
Age: 64
End: 2024-02-05

## 2024-02-05 RX ORDER — EMPAGLIFLOZIN 25 MG/1
25 TABLET, FILM COATED ORAL
Qty: 90 | Refills: 3 | Status: ACTIVE | COMMUNITY
Start: 2021-12-07 | End: 1900-01-01

## 2024-03-21 ENCOUNTER — APPOINTMENT (OUTPATIENT)
Dept: ORTHOPEDIC SURGERY | Facility: CLINIC | Age: 64
End: 2024-03-21
Payer: COMMERCIAL

## 2024-03-21 PROCEDURE — 99213 OFFICE O/P EST LOW 20 MIN: CPT | Mod: 25

## 2024-03-21 PROCEDURE — 20611 DRAIN/INJ JOINT/BURSA W/US: CPT | Mod: 50

## 2024-03-21 NOTE — IMAGING

## 2024-03-21 NOTE — HISTORY OF PRESENT ILLNESS
[de-identified] : Patient returns today to start Visco-3 series. History of bad OA, treating with Dr Carter.

## 2024-03-21 NOTE — PROCEDURE
[FreeTextEntry3] : Visco-3 (Large Joint) with Ultrasound Guidance Viscosupplementation Injection: X-ray evidence of Osteoarthritis on this or prior visit and Patient has tried OTC's including aspirin, Ibuprofen, Aleve etc or prescription NSAIDS, and/or exercises at home and/ or physical therapy without satisfactory response.  An injection of Visco-3 #1  was injected into the bilat knee(s). after verbal consent using sterile technique. The risks, benefits, and alternatives to Viscosupplementation injection were explained in full to the patient. Risks outlined include but are not limited to infection, sepsis, bleeding, scarring, skin discoloration, temporary increase in pain, syncopal episode, failure to resolve symptoms, allergic reaction, and symptom recurrence. Signs and symptoms of infection reviewed and patient advised to call immediately for redness, fevers, and/or chills. Patient understood the risks. All questions were answered. After discussion of options, patient requested Viscosupplementation. Oral informed consent was obtained and sterile prep was done of the injection site. Sterile technique was used without complications. The patient tolerated the procedure well. Ice tonight to the injection site.   Ultrasound Guidance was used for the following reasons: altered anatomic landmarks because of erosive arthritis.   Ultrasound guided injection was performed of the knee, visualization of the needle and placement of injection was performed without complication

## 2024-03-28 ENCOUNTER — APPOINTMENT (OUTPATIENT)
Dept: ORTHOPEDIC SURGERY | Facility: CLINIC | Age: 64
End: 2024-03-28
Payer: COMMERCIAL

## 2024-03-28 PROCEDURE — 99213 OFFICE O/P EST LOW 20 MIN: CPT | Mod: 25

## 2024-03-28 PROCEDURE — 20611 DRAIN/INJ JOINT/BURSA W/US: CPT | Mod: 50

## 2024-03-28 NOTE — PROCEDURE
[FreeTextEntry3] : Visco-3 (Large Joint) with Ultrasound Guidance Viscosupplementation Injection: X-ray evidence of Osteoarthritis on this or prior visit and Patient has tried OTC's including aspirin, Ibuprofen, Aleve etc or prescription NSAIDS, and/or exercises at home and/ or physical therapy without satisfactory response.  An injection of Visco-3 #2  was injected into the bilat knee(s). after verbal consent using sterile technique. The risks, benefits, and alternatives to Viscosupplementation injection were explained in full to the patient. Risks outlined include but are not limited to infection, sepsis, bleeding, scarring, skin discoloration, temporary increase in pain, syncopal episode, failure to resolve symptoms, allergic reaction, and symptom recurrence. Signs and symptoms of infection reviewed and patient advised to call immediately for redness, fevers, and/or chills. Patient understood the risks. All questions were answered. After discussion of options, patient requested Viscosupplementation. Oral informed consent was obtained and sterile prep was done of the injection site. Sterile technique was used without complications. The patient tolerated the procedure well. Ice tonight to the injection site.   Ultrasound Guidance was used for the following reasons: altered anatomic landmarks because of erosive arthritis.   Ultrasound guided injection was performed of the knee, visualization of the needle and placement of injection was performed without complication

## 2024-03-28 NOTE — IMAGING

## 2024-04-04 ENCOUNTER — APPOINTMENT (OUTPATIENT)
Dept: ORTHOPEDIC SURGERY | Facility: CLINIC | Age: 64
End: 2024-04-04
Payer: COMMERCIAL

## 2024-04-04 DIAGNOSIS — M17.11 UNILATERAL PRIMARY OSTEOARTHRITIS, RIGHT KNEE: ICD-10-CM

## 2024-04-04 DIAGNOSIS — M17.12 UNILATERAL PRIMARY OSTEOARTHRITIS, LEFT KNEE: ICD-10-CM

## 2024-04-04 PROCEDURE — 20611 DRAIN/INJ JOINT/BURSA W/US: CPT | Mod: 50

## 2024-04-04 PROCEDURE — 99212 OFFICE O/P EST SF 10 MIN: CPT | Mod: 25

## 2024-04-04 RX ORDER — MELOXICAM 15 MG/1
15 TABLET ORAL
Qty: 30 | Refills: 0 | Status: ACTIVE | COMMUNITY
Start: 2024-04-04 | End: 1900-01-01

## 2024-04-04 NOTE — IMAGING

## 2024-04-04 NOTE — DISCUSSION/SUMMARY
[de-identified] : rest, ice, activity modification.   Follow up with Dr Carter in 6 weeks. Frequency of visco and csi reviewed.

## 2024-04-04 NOTE — PROCEDURE
[FreeTextEntry3] : Visco-3 (Large Joint) with Ultrasound Guidance Viscosupplementation Injection: X-ray evidence of Osteoarthritis on this or prior visit and Patient has tried OTC's including aspirin, Ibuprofen, Aleve etc or prescription NSAIDS, and/or exercises at home and/ or physical therapy without satisfactory response.  An injection of Visco-3 #3  was injected into the bilat knee(s). after verbal consent using sterile technique. The risks, benefits, and alternatives to Viscosupplementation injection were explained in full to the patient. Risks outlined include but are not limited to infection, sepsis, bleeding, scarring, skin discoloration, temporary increase in pain, syncopal episode, failure to resolve symptoms, allergic reaction, and symptom recurrence. Signs and symptoms of infection reviewed and patient advised to call immediately for redness, fevers, and/or chills. Patient understood the risks. All questions were answered. After discussion of options, patient requested Viscosupplementation. Oral informed consent was obtained and sterile prep was done of the injection site. Sterile technique was used without complications. The patient tolerated the procedure well. Ice tonight to the injection site.   Ultrasound Guidance was used for the following reasons: altered anatomic landmarks because of erosive arthritis.   Ultrasound guided injection was performed of the knee, visualization of the needle and placement of injection was performed without complication

## 2024-06-28 ENCOUNTER — APPOINTMENT (OUTPATIENT)
Dept: INTERNAL MEDICINE | Facility: CLINIC | Age: 64
End: 2024-06-28

## 2024-10-02 ENCOUNTER — APPOINTMENT (OUTPATIENT)
Dept: ORTHOPEDIC SURGERY | Facility: CLINIC | Age: 64
End: 2024-10-02
Payer: COMMERCIAL

## 2024-10-02 VITALS — WEIGHT: 245 LBS | HEIGHT: 65 IN | BODY MASS INDEX: 40.82 KG/M2

## 2024-10-02 DIAGNOSIS — M17.12 UNILATERAL PRIMARY OSTEOARTHRITIS, LEFT KNEE: ICD-10-CM

## 2024-10-02 DIAGNOSIS — M17.11 UNILATERAL PRIMARY OSTEOARTHRITIS, RIGHT KNEE: ICD-10-CM

## 2024-10-02 PROCEDURE — 99214 OFFICE O/P EST MOD 30 MIN: CPT

## 2024-10-02 RX ORDER — CELECOXIB 200 MG/1
200 CAPSULE ORAL
Qty: 30 | Refills: 0 | Status: ACTIVE | COMMUNITY
Start: 2024-10-02 | End: 1900-01-01

## 2024-10-02 NOTE — DISCUSSION/SUMMARY
[de-identified] : Patient referred to TKA specialist to discuss the surgery.  Patient allowed to gently start resuming activities. Discussed change to medication prescription and usage. Bracing options discussed with patient for stability and support. Activity modification as needed Discussed poss future surgery, pt deciding, questions answered, no guarantees try topical lidocaine for pain control reviewed current medications used by this patient Home exercises for functional return

## 2024-10-02 NOTE — PHYSICAL EXAM
[Bilateral] : knee bilaterally [5___] : hamstring 5[unfilled]/5 [] : anterior tenderness [[id number]] : flexion 105 degrees [de-identified] : extension 0 degrees

## 2024-10-02 NOTE — HISTORY OF PRESENT ILLNESS
[Gradual] : gradual [4] : 4 [Dull/Aching] : dull/aching [Throbbing] : throbbing [Intermittent] : intermittent [Rest] : rest [Meds] : meds [Standing] : standing [Walking] : walking [Stairs] : stairs [10] : 10 [Sharp] : sharp [de-identified] : Patient has persistent symptoms in both knees, left greater than right. She has pain walking, unable to navigate stairs. She completed Visco-3 without any relief, she had CSI in the past with very short term relief. Last CSI 3/29/24. [] : no [FreeTextEntry1] : B/L KNEES  [FreeTextEntry5] : Patient states the pain has been getting gradually worse.

## 2024-12-25 PROBLEM — F10.90 ALCOHOL USE: Status: ACTIVE | Noted: 2023-03-30

## 2025-01-28 ENCOUNTER — RX RENEWAL (OUTPATIENT)
Age: 65
End: 2025-01-28

## 2025-02-11 ENCOUNTER — NON-APPOINTMENT (OUTPATIENT)
Age: 65
End: 2025-02-11

## 2025-02-11 ENCOUNTER — APPOINTMENT (OUTPATIENT)
Dept: INTERNAL MEDICINE | Facility: CLINIC | Age: 65
End: 2025-02-11
Payer: MEDICARE

## 2025-02-11 VITALS
DIASTOLIC BLOOD PRESSURE: 86 MMHG | RESPIRATION RATE: 17 BRPM | SYSTOLIC BLOOD PRESSURE: 142 MMHG | TEMPERATURE: 97.9 F | WEIGHT: 254.5 LBS | OXYGEN SATURATION: 97 % | HEIGHT: 65 IN | BODY MASS INDEX: 42.4 KG/M2 | HEART RATE: 64 BPM

## 2025-02-11 DIAGNOSIS — E78.5 HYPERLIPIDEMIA, UNSPECIFIED: ICD-10-CM

## 2025-02-11 DIAGNOSIS — Z80.0 FAMILY HISTORY OF MALIGNANT NEOPLASM OF DIGESTIVE ORGANS: ICD-10-CM

## 2025-02-11 DIAGNOSIS — I10 ESSENTIAL (PRIMARY) HYPERTENSION: ICD-10-CM

## 2025-02-11 DIAGNOSIS — E11.9 TYPE 2 DIABETES MELLITUS W/OUT COMPLICATIONS: ICD-10-CM

## 2025-02-11 DIAGNOSIS — Z00.00 ENCOUNTER FOR GENERAL ADULT MEDICAL EXAMINATION W/OUT ABNORMAL FINDINGS: ICD-10-CM

## 2025-02-11 DIAGNOSIS — G60.9 HEREDITARY AND IDIOPATHIC NEUROPATHY, UNSPECIFIED: ICD-10-CM

## 2025-02-11 PROCEDURE — G0008: CPT

## 2025-02-11 PROCEDURE — 99204 OFFICE O/P NEW MOD 45 MIN: CPT | Mod: 25

## 2025-02-11 PROCEDURE — 36415 COLL VENOUS BLD VENIPUNCTURE: CPT

## 2025-02-11 PROCEDURE — 90662 IIV NO PRSV INCREASED AG IM: CPT

## 2025-02-11 PROCEDURE — G0402 INITIAL PREVENTIVE EXAM: CPT

## 2025-02-11 PROCEDURE — 93000 ELECTROCARDIOGRAM COMPLETE: CPT | Mod: 59

## 2025-02-11 RX ORDER — TIRZEPATIDE 2.5 MG/.5ML
2.5 INJECTION, SOLUTION SUBCUTANEOUS
Qty: 1 | Refills: 5 | Status: ACTIVE | COMMUNITY
Start: 2025-02-11 | End: 1900-01-01

## 2025-02-13 LAB
25(OH)D3 SERPL-MCNC: 22.2 NG/ML
ALBUMIN SERPL ELPH-MCNC: 4.2 G/DL
ALP BLD-CCNC: 78 U/L
ALT SERPL-CCNC: 19 U/L
ANION GAP SERPL CALC-SCNC: 12 MMOL/L
APPEARANCE: CLEAR
AST SERPL-CCNC: 20 U/L
BASOPHILS # BLD AUTO: 0.02 K/UL
BASOPHILS NFR BLD AUTO: 0.6 %
BILIRUB SERPL-MCNC: 0.7 MG/DL
BILIRUBIN URINE: NEGATIVE
BLOOD URINE: NEGATIVE
BUN SERPL-MCNC: 18 MG/DL
CALCIUM SERPL-MCNC: 10.3 MG/DL
CHLORIDE SERPL-SCNC: 104 MMOL/L
CHOLEST SERPL-MCNC: 303 MG/DL
CO2 SERPL-SCNC: 26 MMOL/L
COLOR: YELLOW
CREAT SERPL-MCNC: 0.69 MG/DL
CREAT SPEC-SCNC: 102 MG/DL
EGFR: 96 ML/MIN/1.73M2
EOSINOPHIL # BLD AUTO: 0.06 K/UL
EOSINOPHIL NFR BLD AUTO: 1.9 %
ESTIMATED AVERAGE GLUCOSE: 157 MG/DL
GLUCOSE QUALITATIVE U: >=1000 MG/DL
GLUCOSE SERPL-MCNC: 139 MG/DL
HBA1C MFR BLD HPLC: 7.1 %
HCT VFR BLD CALC: 43.1 %
HDLC SERPL-MCNC: 75 MG/DL
HGB BLD-MCNC: 13.9 G/DL
IMM GRANULOCYTES NFR BLD AUTO: 0 %
KETONES URINE: NEGATIVE MG/DL
LDLC SERPL CALC-MCNC: 203 MG/DL
LEUKOCYTE ESTERASE URINE: NEGATIVE
LYMPHOCYTES # BLD AUTO: 1.36 K/UL
LYMPHOCYTES NFR BLD AUTO: 42.5 %
MAN DIFF?: NORMAL
MCHC RBC-ENTMCNC: 31 PG
MCHC RBC-ENTMCNC: 32.3 G/DL
MCV RBC AUTO: 96.2 FL
MICROALBUMIN 24H UR DL<=1MG/L-MCNC: <1.2 MG/DL
MICROALBUMIN/CREAT 24H UR-RTO: NORMAL MG/G
MONOCYTES # BLD AUTO: 0.37 K/UL
MONOCYTES NFR BLD AUTO: 11.6 %
NEUTROPHILS # BLD AUTO: 1.39 K/UL
NEUTROPHILS NFR BLD AUTO: 43.4 %
NITRITE URINE: NEGATIVE
NONHDLC SERPL-MCNC: 228 MG/DL
PH URINE: 5.5
PLATELET # BLD AUTO: 235 K/UL
POTASSIUM SERPL-SCNC: 4.9 MMOL/L
PROT SERPL-MCNC: 6.9 G/DL
PROTEIN URINE: NEGATIVE MG/DL
RBC # BLD: 4.48 M/UL
RBC # FLD: 13.6 %
SODIUM SERPL-SCNC: 142 MMOL/L
SPECIFIC GRAVITY URINE: >1.03
T3 SERPL-MCNC: 91 NG/DL
T4 FREE SERPL-MCNC: 1.2 NG/DL
TRIGL SERPL-MCNC: 137 MG/DL
TSH SERPL-ACNC: 2.41 UIU/ML
UROBILINOGEN URINE: 0.2 MG/DL
VIT B12 SERPL-MCNC: 581 PG/ML
WBC # FLD AUTO: 3.2 K/UL

## 2025-02-13 RX ORDER — ROSUVASTATIN CALCIUM 10 MG/1
10 TABLET, FILM COATED ORAL
Qty: 90 | Refills: 3 | Status: ACTIVE | COMMUNITY
Start: 2025-02-13 | End: 1900-01-01

## 2025-02-15 PROBLEM — G60.9 IDIOPATHIC PERIPHERAL NEUROPATHY: Status: ACTIVE | Noted: 2025-02-15

## 2025-03-11 ENCOUNTER — APPOINTMENT (OUTPATIENT)
Dept: INTERNAL MEDICINE | Facility: CLINIC | Age: 65
End: 2025-03-11